# Patient Record
Sex: MALE | Race: WHITE | NOT HISPANIC OR LATINO | URBAN - METROPOLITAN AREA
[De-identification: names, ages, dates, MRNs, and addresses within clinical notes are randomized per-mention and may not be internally consistent; named-entity substitution may affect disease eponyms.]

---

## 2017-01-31 ENCOUNTER — OFFICE VISIT - HEALTHEAST (OUTPATIENT)
Dept: FAMILY MEDICINE | Facility: CLINIC | Age: 52
End: 2017-01-31

## 2017-01-31 DIAGNOSIS — R10.9 ABDOMINAL PAIN: ICD-10-CM

## 2017-01-31 DIAGNOSIS — M54.50 LOW BACK PAIN: ICD-10-CM

## 2017-02-28 ENCOUNTER — COMMUNICATION - HEALTHEAST (OUTPATIENT)
Dept: FAMILY MEDICINE | Facility: CLINIC | Age: 52
End: 2017-02-28

## 2017-03-07 ENCOUNTER — OFFICE VISIT - HEALTHEAST (OUTPATIENT)
Dept: FAMILY MEDICINE | Facility: CLINIC | Age: 52
End: 2017-03-07

## 2017-03-07 DIAGNOSIS — Z00.00 HEALTH CARE MAINTENANCE: ICD-10-CM

## 2017-03-07 DIAGNOSIS — K40.90 RIGHT INGUINAL HERNIA: ICD-10-CM

## 2017-03-07 ASSESSMENT — MIFFLIN-ST. JEOR: SCORE: 1651.1

## 2017-03-13 ENCOUNTER — RECORDS - HEALTHEAST (OUTPATIENT)
Dept: ADMINISTRATIVE | Facility: OTHER | Age: 52
End: 2017-03-13

## 2017-03-13 ENCOUNTER — OFFICE VISIT - HEALTHEAST (OUTPATIENT)
Dept: SURGERY | Facility: CLINIC | Age: 52
End: 2017-03-13

## 2017-03-13 DIAGNOSIS — K40.90 RIGHT INGUINAL HERNIA: ICD-10-CM

## 2017-03-14 ENCOUNTER — AMBULATORY - HEALTHEAST (OUTPATIENT)
Dept: SURGERY | Facility: CLINIC | Age: 52
End: 2017-03-14

## 2017-03-14 ENCOUNTER — COMMUNICATION - HEALTHEAST (OUTPATIENT)
Dept: FAMILY MEDICINE | Facility: CLINIC | Age: 52
End: 2017-03-14

## 2017-03-16 ENCOUNTER — OFFICE VISIT - HEALTHEAST (OUTPATIENT)
Dept: FAMILY MEDICINE | Facility: CLINIC | Age: 52
End: 2017-03-16

## 2017-03-16 DIAGNOSIS — Z01.818 PREOP EXAMINATION: ICD-10-CM

## 2017-03-16 DIAGNOSIS — K40.90 RIGHT INGUINAL HERNIA: ICD-10-CM

## 2017-03-16 ASSESSMENT — MIFFLIN-ST. JEOR: SCORE: 1653.65

## 2017-03-20 ENCOUNTER — AMBULATORY - HEALTHEAST (OUTPATIENT)
Dept: SURGERY | Facility: CLINIC | Age: 52
End: 2017-03-20

## 2017-03-20 ENCOUNTER — RECORDS - HEALTHEAST (OUTPATIENT)
Dept: ADMINISTRATIVE | Facility: OTHER | Age: 52
End: 2017-03-20

## 2017-03-31 ENCOUNTER — OFFICE VISIT - HEALTHEAST (OUTPATIENT)
Dept: SURGERY | Facility: CLINIC | Age: 52
End: 2017-03-31

## 2017-03-31 DIAGNOSIS — Z98.890 S/P INGUINAL HERNIA REPAIR: ICD-10-CM

## 2017-03-31 DIAGNOSIS — Z87.19 S/P INGUINAL HERNIA REPAIR: ICD-10-CM

## 2017-03-31 ASSESSMENT — MIFFLIN-ST. JEOR: SCORE: 1653.65

## 2017-12-28 ENCOUNTER — OFFICE VISIT - HEALTHEAST (OUTPATIENT)
Dept: INTERNAL MEDICINE | Facility: CLINIC | Age: 52
End: 2017-12-28

## 2017-12-28 DIAGNOSIS — Z00.00 ENCOUNTER FOR PREVENTIVE HEALTH EXAMINATION: ICD-10-CM

## 2017-12-28 DIAGNOSIS — R05.3 CHRONIC COUGH: ICD-10-CM

## 2017-12-28 DIAGNOSIS — E73.9 LACTOSE INTOLERANCE: ICD-10-CM

## 2017-12-28 ASSESSMENT — MIFFLIN-ST. JEOR: SCORE: 1633.24

## 2017-12-29 LAB
CHOLEST SERPL-MCNC: 192 MG/DL
FASTING STATUS PATIENT QL REPORTED: NO
HDLC SERPL-MCNC: 40 MG/DL
LDLC SERPL CALC-MCNC: 129 MG/DL
PSA SERPL-MCNC: 1.1 NG/ML (ref 0–3.5)
TRIGL SERPL-MCNC: 115 MG/DL

## 2018-01-02 ENCOUNTER — COMMUNICATION - HEALTHEAST (OUTPATIENT)
Dept: INTERNAL MEDICINE | Facility: CLINIC | Age: 53
End: 2018-01-02

## 2018-07-13 ENCOUNTER — COMMUNICATION - HEALTHEAST (OUTPATIENT)
Dept: INTERNAL MEDICINE | Facility: CLINIC | Age: 53
End: 2018-07-13

## 2018-09-07 ENCOUNTER — COMMUNICATION - HEALTHEAST (OUTPATIENT)
Dept: INTERNAL MEDICINE | Facility: CLINIC | Age: 53
End: 2018-09-07

## 2018-09-21 ENCOUNTER — RECORDS - HEALTHEAST (OUTPATIENT)
Dept: ADMINISTRATIVE | Facility: OTHER | Age: 53
End: 2018-09-21

## 2018-09-28 ENCOUNTER — RECORDS - HEALTHEAST (OUTPATIENT)
Dept: ADMINISTRATIVE | Facility: OTHER | Age: 53
End: 2018-09-28

## 2018-12-11 ENCOUNTER — RECORDS - HEALTHEAST (OUTPATIENT)
Dept: ADMINISTRATIVE | Facility: OTHER | Age: 53
End: 2018-12-11

## 2019-03-19 ENCOUNTER — RECORDS - HEALTHEAST (OUTPATIENT)
Dept: ADMINISTRATIVE | Facility: OTHER | Age: 54
End: 2019-03-19

## 2019-07-29 ENCOUNTER — RECORDS - HEALTHEAST (OUTPATIENT)
Dept: ADMINISTRATIVE | Facility: OTHER | Age: 54
End: 2019-07-29

## 2019-07-30 ENCOUNTER — RECORDS - HEALTHEAST (OUTPATIENT)
Dept: ADMINISTRATIVE | Facility: OTHER | Age: 54
End: 2019-07-30

## 2021-05-30 VITALS — BODY MASS INDEX: 29.04 KG/M2 | WEIGHT: 191 LBS

## 2021-05-30 VITALS — WEIGHT: 184.44 LBS | HEIGHT: 68 IN | BODY MASS INDEX: 27.95 KG/M2

## 2021-05-30 VITALS — WEIGHT: 193.8 LBS

## 2021-05-30 VITALS — HEIGHT: 68 IN | BODY MASS INDEX: 28.04 KG/M2 | WEIGHT: 185 LBS

## 2021-05-30 VITALS — WEIGHT: 185 LBS | BODY MASS INDEX: 28.04 KG/M2 | HEIGHT: 68 IN

## 2021-05-31 VITALS — BODY MASS INDEX: 27.35 KG/M2 | WEIGHT: 180.5 LBS | HEIGHT: 68 IN

## 2021-06-08 NOTE — PROGRESS NOTES
Assessment:  1. Abdominal pain  HM2(CBC w/o Differential)    Comprehensive Metabolic Panel    Lipase    Urinalysis   2. Low back pain       Results for orders placed or performed in visit on 01/31/17   HM2(CBC w/o Differential)   Result Value Ref Range    WBC 8.1 4.0 - 11.0 thou/uL    RBC 5.31 4.40 - 6.20 mill/uL    Hemoglobin 15.2 14.0 - 18.0 g/dL    Hematocrit 45.5 40.0 - 54.0 %    MCV 86 80 - 100 fL    MCH 28.6 27.0 - 34.0 pg    MCHC 33.4 32.0 - 36.0 g/dL    RDW 12.6 11.0 - 14.5 %    Platelets 268 140 - 440 thou/uL    MPV 7.3 7.0 - 10.0 fL   Comprehensive Metabolic Panel   Result Value Ref Range    Sodium 140 136 - 145 mmol/L    Potassium 4.4 3.5 - 5.0 mmol/L    Chloride 104 98 - 107 mmol/L    CO2 28 22 - 31 mmol/L    Anion Gap, Calculation 8 5 - 18 mmol/L    Glucose 96 70 - 125 mg/dL    BUN 15 8 - 22 mg/dL    Creatinine 1.22 0.70 - 1.30 mg/dL    GFR MDRD Af Amer >60 >60 mL/min/1.73m2    GFR MDRD Non Af Amer >60 >60 mL/min/1.73m2    Bilirubin, Total 0.4 0.0 - 1.0 mg/dL    Calcium 9.5 8.5 - 10.5 mg/dL    Protein, Total 7.0 6.0 - 8.0 g/dL    Albumin 4.1 3.5 - 5.0 g/dL    Alkaline Phosphatase 63 45 - 120 U/L    AST 17 0 - 40 U/L    ALT 24 0 - 45 U/L   Lipase   Result Value Ref Range    Lipase 19 0 - 52 U/L   Urinalysis   Result Value Ref Range    Color, UA Yellow Colorless, Yellow, Straw, Light Yellow    Clarity, UA Clear Clear    Glucose, UA Negative Negative    Bilirubin, UA Negative Negative    Ketones, UA Negative Negative    Specific Gravity, UA 1.020 1.002 - 1.030    Blood, UA Negative Negative    pH, UA 6.0 4.5 - 8.0    Protein, UA Negative Negative mg/dL    Urobilinogen, UA 1.0 E.U./dL 0.2 E.U./dL, 1.0 E.U./dL    Nitrite, UA Negative Negative    Leukocytes, UA Negative Negative     Exam is essentially normal today. Not an acute abd. This does not fit a musculoskeletal cause of low back pain as it is not worse with activity. I suspect there is abd source potentially but no acute findings on labs. Recommend  f/u for further evaluation, a CT scan may be considered. He is due for a colonoscopy also.     Plan:  Low back and abd pain  Will call with remaining results, if elevated a scan may be needed  If labs normal, recommend follow up with primary care for further evaluation  Monitor symptoms and record     SUBJECTIVE: Marty Betancourt is a 51 y.o. male who complains of low back pain mostly on right for 2 months, and has been getting more frequent. It is not worse with bending or lifting and does not radiate down the legs. This is correlated with umbilical abd pain. Currently very mild pain. When it flares up its a 4/10 tight pain. It typically lasts a couple of hours. He has not taken anything for pain, resolves on his own.  Improved with water. Occasional he gets nausea with the pain. The flare up of pain occurs a couple times a wk.     He denies any fever, chills, chest pain, SOB, dyspnea, cough, URI symptoms, vomiting, diarrhea, constipation, melena, hematochezia, urinary symptoms, flank pain, rashes, numbness or weakness in legs, unexpected wt loss, rash, incontinence or saddle distribution anesthesia.  Prior history of back problems: recurrent self limited episodes of low back pain in the past.     No PMD. No medical hx.   ROS otherwise noncontributory.    No past medical history on file.     Current Medications:  No current outpatient prescriptions on file prior to visit.     No current facility-administered medications on file prior to visit.         Allergies:  No Known Allergies     OBJECTIVE:   Visit Vitals     /78     Pulse (!) 57     Temp 97.9  F (36.6  C) (Oral)     Resp 16     Wt 193 lb 12.8 oz (87.9 kg)     SpO2 96%        General:Alert, polite, cooperative. Appears healthy. Patient appears to be in no pain.    Lungs: Chest is clear, no wheezing or rales. Symmetric air entry throughout both lung fields.   Heart: regular rate and rhythm, no murmur, rub or gallop  Abd: round, soft, nontender to  palpation. No masses, organomegaly, rebound tenderness, guarding, mcburney's point tenderness, CVA tenderness  MS: Normal gait noted.  Inspection shows no erythema, inflammation or deformity of back. There is tenderness along the paravetebrals in the right lumbar region. There is no spine bony tenderness or mass. Has full AROM of the back and hips.     Neurologic: Strength in the major muscle groups of LE intact and equal. DTRs in LE intact and equal.

## 2021-06-09 NOTE — PROGRESS NOTES
"HPI: Pt is here for follow up of a right inquinal hernia repair with Dr. Balderas.   he is doing well.  Pain is well controlled.  No difficulties with the surgical wound/wounds.  he is eating well and denies fever and chills.       /81  Pulse 62  Ht 5' 8\" (1.727 m)  Wt 185 lb (83.9 kg)  SpO2 97%  BMI 28.13 kg/m2    EXAM:  GENERAL:Appears well  ABDOMEN:  Soft, +BS  SURGICAL WOUNDS:  Incisions healing well, no enduration or drainage.    .lastlab[CASEREPORT    Assessment/Plan: . Doing well after surgery and should follow up as needed.  Shivani Davis , [facility] Surgery   "

## 2021-06-09 NOTE — PROGRESS NOTES
"Assessment / Impression     1. Right inguinal hernia  Ambulatory referral to General Surgery   2. Health care maintenance  Ambulatory referral for Colonoscopy         Plan:     He was given a referral to see a general surgeon for further evaluation and to discuss treatment options for the right inguinal hernia.    Subjective:      HPI: Marty Betancourt is a 51 y.o. male who presents to the clinic complaining of a right bulge in his groin that he noticed 1 month ago.  He denies having pain.  He denies dysuria, scrotal swelling and his bowel movements are normal.  He is able to push the bulge in.  When he presses on it frequently it can lead to nausea symptoms.  He works as a  and is a  for [employer].        Review of Systems  All other systems reviewed and are negative.     History   Smoking Status     Never Smoker   Smokeless Tobacco     Not on file       No family history on file.    Objective:     Visit Vitals     /80 (Patient Site: Left Arm, Patient Position: Sitting, Cuff Size: Adult Regular)     Pulse 84     Temp 98.3  F (36.8  C) (Oral)     Resp 16     Ht 5' 8\" (1.727 m)     Wt 184 lb 7 oz (83.7 kg)     BMI 28.04 kg/m2     Physical Examination: General appearance - alert, well appearing, and in no distress  Eyes: extraocular eye movements intact  Mouth: mucous membranes moist  Neurological: alert, oriented, normal speech, no focal findings or movement disorder noted.    Psychiatric: Normal affect. Does not appear anxious or depressed.  : Right inguinal bulge.  No scrotal tenderness or swelling.    No results found for this or any previous visit (from the past 168 hour(s)).    No current outpatient prescriptions on file.       "

## 2021-06-09 NOTE — PROGRESS NOTES
HPI:  I am consulted by Scottie Velasquez DO in this 51 y.o. male regarding an inguinal hernia. He has noted this for the past 2 months. He has discomfort and a bulge. The pain is mild. He has occasional nausea when he bends over.    No Known Allergies    No current outpatient prescriptions on file.    Past Medical History:   Diagnosis Date     Hernia        Past Surgical History:   Procedure Laterality Date     EYE SURGERY Right      FINGER SURGERY Left      KNEE ARTHROSCOPY Right        Social History     Social History     Marital status:      Spouse name: N/A     Number of children: N/A     Years of education: N/A     Occupational History     Not on file.     Social History Main Topics     Smoking status: Never Smoker     Smokeless tobacco: Never Used     Alcohol use Yes      Comment: occasionally     Drug use: No     Sexual activity: Yes     Partners: Female     Other Topics Concern     Not on file     Social History Narrative       Review of Systems - Negative except as noted in the HPI.    Visit Vitals     /78 (Patient Site: Left Arm, Patient Position: Sitting, Cuff Size: Adult Regular)     Pulse 79     Wt 191 lb (86.6 kg)     SpO2 96%     BMI 29.04 kg/m2     Body mass index is 29.04 kg/(m^2).    EXAM:  GENERAL: Well developed male in no distress.  CARDIAC: RRR w/out murmur   CHEST/LUNG: Clear  ABDOMEN:  Right inguinal hernia. The left side is normal. Abdomen soft and nontender.  GENITAL: Both testicles descended without masses      Assessment/Plan: This patient has a  right inguinal hernia. I discussed this at length with him.  I went over conservative management as well as surgical treatment of this. I discussed both open and laparoscopic approaches. He prefers the laparoscopic operation. I will plan on doing this laparoscopically understanding the possibility of converting to an open operation. I went over the small risks of surgery including but not limited to bleeding and  infection. I discussed the expected recovery time as well.   Will get this scheduled.      Noah Balderas MD  [facility] Department of Surgery

## 2021-06-09 NOTE — PROGRESS NOTES
Scheduled pt for lap right ing hernia repair at [facility] 3/20/17 per Dr Balderas.   Reviewed preop instructions including NPO 8hrs prior, need for preop physical, need for  and adult to stay with pt for first 24 hrs postop.  Pt verbalized understanding and had no further questions.  Encouraged to call if needed.

## 2021-06-09 NOTE — PROGRESS NOTES
Assessment/Plan:     1. Preop examination  Hemoglobin    Basic Metabolic Panel   2. Right inguinal hernia       Preop examination done on patient for a right-sided inguinal hernia repair. Patient is low risk for complications related to planned procedure, would recommend proceeding as scheduled without further clinical clarification. EKG deferred; he is asymptomatic in terms of chest pain or shortness of breath, no cardiac history. Labs as below are normal, attached at the end of this report.    Subjective:     Scheduled Procedure: Inguinal Hernia repair Right side  Surgery Date:  3/20/17  Surgery Location:  [facility]  Surgeon:  Dr Balderas    He developed a hernia about 2 months ago. He reports that he had some low back pain along with nausea with bending. He was evaluated at it was determined he had a right inguinal hernia repair. He works as a  as well as a  for [employer].     No current outpatient prescriptions on file.     No current facility-administered medications for this visit.        No Known Allergies    Immunization History   Administered Date(s) Administered     Td, historic 03/17/2005     Tdap 03/07/2017       Patient Active Problem List   Diagnosis     Onychomycosis Of The Toenails     Right inguinal hernia       Past Medical History:   Diagnosis Date     Hernia        Social History     Social History     Marital status:      Spouse name: N/A     Number of children: N/A     Years of education: N/A     Occupational History     Not on file.     Social History Main Topics     Smoking status: Never Smoker     Smokeless tobacco: Never Used     Alcohol use Yes      Comment: occasionally     Drug use: No     Sexual activity: Yes     Partners: Female     Other Topics Concern     Not on file     Social History Narrative       Past Surgical History:   Procedure Laterality Date     EYE SURGERY Right      FINGER SURGERY Left      KNEE ARTHROSCOPY Right   "      History of Present Illness  Recent Health  Fever: no  Chills: no  Fatigue: no  Chest Pain: no  Cough: no  Dyspnea: no  Urinary Frequency: no  Nausea: no  Vomiting: no  Diarrhea: no  Abdominal Pain: no  Easy Bruising: no  Lower Extremity Swelling: no  Poor Exercise Tolerance: no    Most recent Health Maintenance Visit:  \"Long time ago\"     Pertinent History  Prior Anesthesia: yes  Previous Anesthesia Reaction:  no  Diabetes: no  Cardiovascular Disease: no  Pulmonary Disease: no  Renal Disease: no  GI Disease: no  Sleep Apnea: no  Thromboembolic Problems: no  Clotting Disorder: no  Bleeding Disorder: no  Transfusion Reaction: no  Impaired Immunity: no  Steroid use in the last 6 months: no  Frequent Aspirin use: no    Family history of anesthesia reaction, MI, Stroke, Aneurysm, sudden death, clotting disorder and bleeding disorder    Social history of patient does not wear denture or partial plates, there is no transfusion refusal and there are no concerns regarding care after surgery    After surgery, the patient plans to recover at home with family.    Review of Systems  Pertinent items are noted in HPI.          Objective:         Vitals:    03/16/17 0902   BP: 124/82   Pulse: 60   Resp: 16   Weight: 185 lb (83.9 kg)   Height: 5' 8\" (1.727 m)       Physical Exam:  General appearance - alert, well appearing, and in no distress  Mental status - alert, oriented to person, place, and time  Eyes - pupils equal and reactive, extraocular eye movements intact  Ears - bilateral TM's and external ear canals normal  Nose - normal and patent, no erythema, discharge or polyps  Mouth - mucous membranes moist, pharynx normal without lesions  Neck - supple, no significant adenopathy  Lymphatics - no palpable lymphadenopathy, no hepatosplenomegaly  Chest - clear to auscultation, no wheezes, rales or rhonchi, symmetric air entry  Heart - normal rate, regular rhythm, normal S1, S2, no murmurs, rubs, clicks or gallops  Abdomen - " soft, nontender, nondistended, no masses or organomegaly   Male - HERNIA EXAM: right inguinal hernia  Rectal - deferred, not clinically indicated  Back exam - full range of motion, no tenderness, palpable spasm or pain on motion  Neurological - alert, oriented, normal speech, no focal findings or movement disorder noted  Musculoskeletal - no joint tenderness, deformity or swelling  Extremities - peripheral pulses normal, no pedal edema, no clubbing or cyanosis  Skin - normal coloration and turgor, no rashes, no suspicious skin lesions noted     Results for orders placed or performed in visit on 03/16/17   Hemoglobin   Result Value Ref Range    Hemoglobin 14.9 14.0 - 18.0 g/dL   Basic Metabolic Panel   Result Value Ref Range    Sodium 142 136 - 145 mmol/L    Potassium 5.7 (H) 3.5 - 5.0 mmol/L    Chloride 107 98 - 107 mmol/L    CO2 29 22 - 31 mmol/L    Anion Gap, Calculation 6 5 - 18 mmol/L    Glucose 89 70 - 125 mg/dL    Calcium 9.8 8.5 - 10.5 mg/dL    BUN 13 8 - 22 mg/dL    Creatinine 0.93 0.70 - 1.30 mg/dL    GFR MDRD Af Amer >60 >60 mL/min/1.73m2    GFR MDRD Non Af Amer >60 >60 mL/min/1.73m2

## 2021-06-15 PROBLEM — K40.90 RIGHT INGUINAL HERNIA: Status: ACTIVE | Noted: 2017-03-07

## 2021-06-15 NOTE — PROGRESS NOTES
HISTORY AND PHYSICAL    Marty Betancourt  [address] [facility] [address] [facility] [address]  : 1965    Assessment/Plan:   He is healthy without significant risk factors.   1.  Chronic cough and rhinitis suggestive of vasomotor, or non-allergic.  Long standing.  CXR normal today.  I offered ENT evaluation he declined.  No clinical indication of reflux.     2. Hernia - asymptomatic after repair. No rx indicated.     3. Encounter for general health preventive evaluation - referral for colonoscopy, CMP and CBC today, come in prn for fasting lipid cascade.  PSA today.  Discussed healthy life style and diet.      CHIEF COMPLAINT:  Marty Betancourt is a 52 y.o. male  Desires establishing care, general health evaluation,  and evaluation of chronic cough.     HPI:  Patient  Is 52 y.o. male who has had for a number of years a tendency to cough.  This is described as a brassy need to clear his throat.  He has chronic rhinitis symptoms, worse with dry air, cold air, and jj air, but without allergic symptoms.  No dysphagia, or fever, or sputum production.  No symptoms of asthma, or dyspnea or chest pain. He has had myalgia/arthralgia of shoulders and elbows lately, mild.  He is working harder recently and more full time for [employer] as a .  He feels this is likely the cause.  No severe pain.     ALLERGIES/SENSITIVITIES: No Known Allergies    Past Medical History:   Diagnosis Date     Hernia      Past Surgical History:   Procedure Laterality Date     EYE SURGERY Right      FINGER SURGERY Left      INGUINAL HERNIA REPAIR Right 2017    DR. ASENCIO     KNEE ARTHROSCOPY Right        REVIEW OF SYSTEMS:   Constitutional: no fever, chills, or sweats  Respiratory: see HPI  Cardiovascular: No chest pain or palpitations  Gastrointestinal: No nausea, vomiting, diarrhea, dyspepsia, or pain  Genitourinary: No urgency, frequency, or dysuria  Musculoskeletal: arthralgias of arms and elbows see HPI  Neurological:  "No headache, arm or leg numbness or weakness, or gait disturbance  All other systems on reveiw are negative.    Social History     Social History Main Topics     Smoking status: Never Smoker     Smokeless tobacco: Never Used     Alcohol use Yes      Comment: occasionally     Drug use: No     Sexual activity: Yes     Partners: Female        Family History   Problem Relation Age of Onset     No Medical Problems Mother      Heart disease Father      Pacemaker Father      Bipolar disorder Daughter      traumatic brain injury.      PHYSICAL EXAM:  General Appearance: In no acute distress, alert and oriented.   /78 (Patient Site: Left Arm, Patient Position: Sitting, Cuff Size: Adult Regular)  Pulse 88  Ht 5' 8\" (1.727 m)  Wt 180 lb 8 oz (81.9 kg)  BMI 27.44 kg/m2  EYES: Clear, without inflammation   HEENT: Without congestion or inflammation  NECK:  supple, without adenopathy or thryroid enlargement  CHEST: no focal tenderness   BREAST: deferred  RESPIRATORY: Clear to auscultation, no wheezes or rales  CARDIOVASCULAR: S1, S2, without murmur, rub, or gallop   ABDOMEN: soft, flat, and non-tender, without hepatosplenomegaly, mass, rebound, or guarding  RECTAL: deferred  GENITOURINARY: deferred  MUSCULOSKELETAL: No joint swelling, or inflammation  PERIPHERAL PULSES:  full, no edema  NEUROLOGIC: Non-focal, no arm or leg  weakness, speech is clear  PSYCHIATRIC: Oriented X 3, without confusion, behavior and affect normal    Radiographs Reviewed: CXR is negative for significant findings.     LINDSEY BARNES MD  "

## 2021-08-22 ENCOUNTER — HEALTH MAINTENANCE LETTER (OUTPATIENT)
Age: 56
End: 2021-08-22

## 2021-10-17 ENCOUNTER — HEALTH MAINTENANCE LETTER (OUTPATIENT)
Age: 56
End: 2021-10-17

## 2022-10-01 ENCOUNTER — HEALTH MAINTENANCE LETTER (OUTPATIENT)
Age: 57
End: 2022-10-01

## 2023-10-21 ENCOUNTER — HEALTH MAINTENANCE LETTER (OUTPATIENT)
Age: 58
End: 2023-10-21